# Patient Record
Sex: FEMALE | Race: OTHER | URBAN - METROPOLITAN AREA
[De-identification: names, ages, dates, MRNs, and addresses within clinical notes are randomized per-mention and may not be internally consistent; named-entity substitution may affect disease eponyms.]

---

## 2022-06-26 ENCOUNTER — EMERGENCY (EMERGENCY)
Facility: HOSPITAL | Age: 22
LOS: 0 days | Discharge: HOME | End: 2022-06-27
Attending: EMERGENCY MEDICINE
Payer: MEDICAID

## 2022-06-26 VITALS
RESPIRATION RATE: 18 BRPM | TEMPERATURE: 99 F | OXYGEN SATURATION: 97 % | SYSTOLIC BLOOD PRESSURE: 124 MMHG | DIASTOLIC BLOOD PRESSURE: 74 MMHG | HEART RATE: 89 BPM

## 2022-06-26 DIAGNOSIS — N94.6 DYSMENORRHEA, UNSPECIFIED: ICD-10-CM

## 2022-06-26 DIAGNOSIS — Z20.822 CONTACT WITH AND (SUSPECTED) EXPOSURE TO COVID-19: ICD-10-CM

## 2022-06-26 DIAGNOSIS — R10.2 PELVIC AND PERINEAL PAIN: ICD-10-CM

## 2022-06-26 DIAGNOSIS — R10.30 LOWER ABDOMINAL PAIN, UNSPECIFIED: ICD-10-CM

## 2022-06-26 DIAGNOSIS — R51.9 HEADACHE, UNSPECIFIED: ICD-10-CM

## 2022-06-26 LAB
BASOPHILS # BLD AUTO: 0.03 K/UL — SIGNIFICANT CHANGE UP (ref 0–0.2)
BASOPHILS NFR BLD AUTO: 0.3 % — SIGNIFICANT CHANGE UP (ref 0–2)
EOSINOPHIL # BLD AUTO: 0.32 K/UL — SIGNIFICANT CHANGE UP (ref 0–0.5)
EOSINOPHIL NFR BLD AUTO: 2.8 % — SIGNIFICANT CHANGE UP (ref 0–6)
HCT VFR BLD CALC: 39 % — SIGNIFICANT CHANGE UP (ref 34.5–45)
HGB BLD-MCNC: 13.5 G/DL — SIGNIFICANT CHANGE UP (ref 11.5–15.5)
IMM GRANULOCYTES NFR BLD AUTO: 0.3 % — SIGNIFICANT CHANGE UP (ref 0–1.5)
LYMPHOCYTES # BLD AUTO: 3.77 K/UL — HIGH (ref 1–3.3)
LYMPHOCYTES # BLD AUTO: 33.1 % — SIGNIFICANT CHANGE UP (ref 13–44)
MCHC RBC-ENTMCNC: 31 PG — SIGNIFICANT CHANGE UP (ref 27–34)
MCHC RBC-ENTMCNC: 34.6 GM/DL — SIGNIFICANT CHANGE UP (ref 32–36)
MCV RBC AUTO: 89.7 FL — SIGNIFICANT CHANGE UP (ref 80–100)
MONOCYTES # BLD AUTO: 0.69 K/UL — SIGNIFICANT CHANGE UP (ref 0–0.9)
MONOCYTES NFR BLD AUTO: 6.1 % — SIGNIFICANT CHANGE UP (ref 2–14)
NEUTROPHILS # BLD AUTO: 6.56 K/UL — SIGNIFICANT CHANGE UP (ref 1.8–7.4)
NEUTROPHILS NFR BLD AUTO: 57.4 % — SIGNIFICANT CHANGE UP (ref 43–77)
PLATELET # BLD AUTO: 272 K/UL — SIGNIFICANT CHANGE UP (ref 150–400)
RBC # BLD: 4.35 M/UL — SIGNIFICANT CHANGE UP (ref 3.8–5.2)
RBC # FLD: 12.2 % — SIGNIFICANT CHANGE UP (ref 10.3–14.5)
WBC # BLD: 11.4 K/UL — HIGH (ref 3.8–10.5)
WBC # FLD AUTO: 11.4 K/UL — HIGH (ref 3.8–10.5)

## 2022-06-26 PROCEDURE — 99285 EMERGENCY DEPT VISIT HI MDM: CPT

## 2022-06-26 PROCEDURE — 0241U: CPT

## 2022-06-26 PROCEDURE — 76830 TRANSVAGINAL US NON-OB: CPT

## 2022-06-26 PROCEDURE — 99284 EMERGENCY DEPT VISIT MOD MDM: CPT | Mod: 25

## 2022-06-26 PROCEDURE — 80053 COMPREHEN METABOLIC PANEL: CPT

## 2022-06-26 PROCEDURE — 36415 COLL VENOUS BLD VENIPUNCTURE: CPT

## 2022-06-26 PROCEDURE — 93975 VASCULAR STUDY: CPT

## 2022-06-26 PROCEDURE — 81001 URINALYSIS AUTO W/SCOPE: CPT

## 2022-06-26 PROCEDURE — 85025 COMPLETE CBC W/AUTO DIFF WBC: CPT

## 2022-06-26 RX ORDER — SODIUM CHLORIDE 9 MG/ML
1000 INJECTION INTRAMUSCULAR; INTRAVENOUS; SUBCUTANEOUS ONCE
Refills: 0 | Status: COMPLETED | OUTPATIENT
Start: 2022-06-26 | End: 2022-06-26

## 2022-06-26 RX ORDER — ACETAMINOPHEN 500 MG
650 TABLET ORAL ONCE
Refills: 0 | Status: COMPLETED | OUTPATIENT
Start: 2022-06-26 | End: 2022-06-26

## 2022-06-26 RX ADMIN — Medication 650 MILLIGRAM(S): at 23:31

## 2022-06-26 RX ADMIN — SODIUM CHLORIDE 2000 MILLILITER(S): 9 INJECTION INTRAMUSCULAR; INTRAVENOUS; SUBCUTANEOUS at 23:31

## 2022-06-26 NOTE — ED ADULT TRIAGE NOTE - CHIEF COMPLAINT QUOTE
Pt comes to the ED complaining of fever, headache, abdominal pain and nausea. Pt suspects that she may be pregnant. Pt states that she has had her period 3 times this month 6/1, 6/8 and 6/26. ZACH

## 2022-06-26 NOTE — ED PROVIDER NOTE - NSFOLLOWUPINSTRUCTIONS_ED_ALL_ED_FT
Dismenorrea  Dysmenorrhea       La dismenorrea consiste en tener calambres dolorosos sabrina la menstruación (período menstrual). Sentirá dolor en la iza baja del vientre (abdomen). La causa del dolor son los espasmos (contracciones) de los músculos del útero. El dolor puede ser leve o muy intenso. Con esta afección, puede presentar lo siguiente:    Dolor de isma.  Malestar estomacal (náuseas).  Vómitos.  Sentir dolor en la parte inferior de la espalda.    Siga estas indicaciones en loza casa:      Aliviar el dolor y los calambres     Aplique calor en la parte inferior de la espalda o el vientre cuando tiene dolor o calambres. Use la muriel de calor que el médico le indique.    Colóquese homer toalla entre la piel y la muriel de calor.  Aplique el calor sabrina 20 a 30 minutos.  Retire la muriel de calor si la piel se pone de color ryan brillante. [place] es muy importante si no puede sentir el dolor, el calor o el frío.  No duerma con la almohadilla térmica.  Ginger ejercicios aeróbicos. Estos pueden incluir caminar, nadar o andar en bicicleta. Pueden ayudar a aliviar los calambres.  Masajee la iza inferior de la espalda o el vientre. [place] puede ayudar a reducir el dolor.        Instrucciones generales    [place] los medicamentos de venta karen y los recetados solamente tyler se lo haya indicado el médico.  No conduzca ni use maquinaria pesada mientras storm analgésicos recetados.  Evite la ingesta de alcohol y cafeína inmediatamente antes y sabrina el período menstrual. [place] puede empeorar los calambres.  No consuma ningún producto que contenga nicotina o tabaco. [place] incluye cigarrillos y cigarrillos electrónicos. Si necesita ayuda para dejar de fumar, consulte al médico.  Concurra a todas las visitas de control tyler se lo haya indicado el médico. [place] es importante.    Comuníquese con un médico si:  Siente un dolor que empeora.  El dolor no mejora con medicamentos.  Siente dolor sabrian las relaciones sexuales.  Siente malestar estomacal o tiene vómitos sabrina el período menstrual, que no se van con medicamentos.    Solicite ayuda de inmediato si:  Pierde el conocimiento (se desmaya).    Resumen  La dismenorrea consiste en tener calambres dolorosos sabrina la menstruación (período menstrual).  Aplique calor en la parte inferior de la espalda o el vientre cuando tiene dolor o calambres.  Ginger ejercicios aeróbicos tyler caminar, nadar o andar en bicicleta.  Comuníquese con un médico si siente dolor sabrina la relación sexual.    NOTAS ADICIONALES E INSTRUCCIONES    Please follow up with your Primary MD in 24-48 hr.  Seek immediate medical care for any new/worsening signs or symptoms.

## 2022-06-26 NOTE — ED PROVIDER NOTE - CLINICAL SUMMARY MEDICAL DECISION MAKING FREE TEXT BOX
Differential diagnosis includes ovarian cyst/torsion, intrauterine pregnancy/ectopic pregnancy, dysmenorrhea.  Plan: Pain control with Tylenol, CBC, CMP, urinalysis with culture, urine pregnancy test and transvaginal ultrasound.

## 2022-06-26 NOTE — ED PROVIDER NOTE - OBJECTIVE STATEMENT
23 y/o F with no PMHx p/w intermittent dull lower abdominal and pelvic pain for the past 4 days gradually becoming worse, dull headache and 3 periods this month.  Pt is concerned she may be pregnant but didn't yet take a test.  Pt notes some hematuria.  She notes her current period started today and was heavy in the AM but light currently.

## 2022-06-26 NOTE — ED PROVIDER NOTE - INTERNATIONAL TRAVEL
ED Attending Physician Note      Patient : Luci Ayala Age: 54 year old Sex: female   MRN: [id number] Encounter Date: 3/7/2020      History   Time Seen: 7:43 PM      Chief Complaint   Patient presents with   • Back Pain     HPI    54-year-old female presents emerge department, right-sided low back pain rating down the right lower extremity.  The patient second ER visit this week for this problem, and she was also seen in the neurosurgery clinic.  She is been having symptoms for about 6 to 8 weeks, she was taking NSAIDs at home without relief.  The pain is worse with movement.  Is located in the right low back and right buttock and radiates down the right leg.  No trauma to the area, no fever, no history of IV drug abuse.  No recent weight loss.  No loss of bowel or bladder, no saddle anesthesia.   After ER evaluation earlier this week, her pain was mildly relieved with Toradol, [place].  She was able to ambulate.  She picked up a prescription for Valium, Medrol Dosepak, Lidoderm patch is not having any significant relief.  She was seen in the neurosurgery clinic and referred for physical therapy which she has been doing, in addition she was started on Robaxin but the pain is been getting progressively worse and she is no longer able to walk.  She denies any focal weakness but states the pain is so severe that she cannot sit up or move.    She does not smoke or drink alcohol  her only other medical history is depression for which she takes Effexor.      PMD: Lazara Hathaway MD    No Known Allergies    Current Discharge Medication List      Prior to Admission Medications    Details   venlafaxine XR (EFFEXOR XR) 37.5 MG 24 hr capsule Take 1 capsule by mouth daily.  Refills: 1      methocarbamol (ROBAXIN) 750 MG tablet Take 1 tablet by mouth 4 times daily as needed (muscle spasm).  Qty: 30 tablet, Refills: 0      methylPREDNISolone (MEDROL DOSEPAK) 4 MG tablet Take 1 tablet by mouth as directed. follow package  directions  Qty: 21 tablet, Refills: 0      diazePAM (VALIUM) 5 MG tablet Take 1 tablet by mouth nightly as needed for Muscle spasms.  Qty: 5 tablet, Refills: 0             Current Discharge Medication List          No past medical history on file.    Past Surgical History:   Procedure Laterality Date   • CHOLECYSTECTOMY     • HYSTERECTOMY     • SHOULDER SURGERY Bilateral    • TONSILLECTOMY         No family history on file.    Social History     Tobacco Use   • Smoking status: Never Smoker   • Smokeless tobacco: Never Used   Substance Use Topics   • Alcohol use: Not Currently     Comment: rare use   • Drug use: Never       Review of Systems    REVIEW OF SYSTEMS    Constitutional:  As above  Skin:  No rash, no pruritus.    ENMT:  No sore throat, no nasal congestion, No ear pain,    Respiratory:  No cough, No shortness of breath,    Cardiovascular: no chest pressure, No palpitations,    Gastrointestinal: No abdominal pain, no nausea, no vomiting, no diarrhea.    Genitourinary: No dysuria, no hematuria.    Neurologic:  As above    Hematologic/Lymphatic: no abnormal bruIsing or bleeding  Musculoskeletal: as above  Additional review of systems information: All other systems reviewed and otherwise negative.     Physical Exam     ED Triage Vitals [03/07/20 1951]   ED Triage Vitals Group      Temp 98.5 °F (36.9 °C)      Heart Rate 88      Resp 16      BP (!) 152/87      SpO2 96 %      EtCO2 mmHg       Height 5' 4\" (1.626 m)      Weight 173 lb 4.5 oz (78.6 kg)      Weight Scale Used ED Actual      BMI (Calculated) 29.74      IBW/kg (Calculated) 54.7       Physical Exam  General: Patient is alert. Uncomfortable    Skin: The skin is warm and dry withou rash or lesions.    Eyes: The eyes are anicteric, PERRLA and with EOMI.    HEENT: normal MM, no jaw malocclusion   CV: The heart rate is regular in rate and rhythm with no murmurs.  There is no peripheral edema.  Cap refill is <3 seconds in all extremities.    Pulm:  Respirations are regular and unlabored with clear lungs bilaterally.     GI: The abdomen is soft, with no distension or guarding.   : deferred  Extremities: Normal ROM, no deformity, strength equal throughout   +R. Straight leg test. Neg contralateral leg test.  No midline tenderness/deformtiy   Neuro: intact sensation to light touch throughout. Normal tone.  Normal patellar and achilles reflexes     ED Course     MDM  54-year-old female presents emerge department with right low back pain with radicular symptoms consistent with sciatica.  Patient is extremely uncomfortable on exam, consistent with her presentation under this week as well.  She has tried NSAID therapy for about 6 weeks prior to initially coming to the emergency room.  Since that time she has tried opiates, Valium, Robaxin followed up with neurosurgery and done physical therapy and is still having persistent pain and in fact worsening of her symptoms.  As such she will place an IV and give morphine, Zofran, Toradol, Decadron and plan for observation overnight for pain control and further neurosurgery evaluation and treatment.    8:16 PM  D/w dr. shepherd on call for amg nsgy who agrees w/ this plan.  Will not obtain MRI overnight as is not emergent, no concern for acute cord compression, can be done in AM.      8:45 PM  D/w dr. boswell on call [facility] hosp accepts pt into obs    ED course:           Lab Results     Results for orders placed or performed during the hospital encounter of 03/07/20   CBC with Automated Differential   Result Value Ref Range    WBC 10.5 4.2 - 11.0 K/mcL    RBC 4.63 4.00 - 5.20 mil/mcL    HGB 14.3 12.0 - 15.5 g/dL    HCT 44.3 36.0 - 46.5 %    MCV 95.7 78.0 - 100.0 fl    MCH 30.9 26.0 - 34.0 pg    MCHC 32.3 32.0 - 36.5 g/dL    RDW-CV 12.8 11.0 - 15.0 %     140 - 450 K/mcL    NRBC 0 0 /100 WBC    DIFF TYPE AUTOMATED DIFFERENTIAL     Neutrophil 77 %    LYMPH 13 %    MONO 8 %    EOSIN 0 %    BASO 1 %    Percent Immature  Granuloctyes 1 %    Absolute Neutrophil 8.1 (H) 1.8 - 7.7 K/mcL    Absolute Lymph 1.4 1.0 - 4.0 K/mcL    Absolute Mono 0.9 0.3 - 0.9 K/mcL    Absolute Eos 0.0 (L) 0.1 - 0.5 K/mcL    Absolute Baso 0.1 0.0 - 0.3 K/mcL    Absolute Immature Granulocytes 0.1 0 - 0.2 K/mcl   Basic Metabolic Panel   Result Value Ref Range    Sodium 137 135 - 145 mmol/L    Potassium 3.6 3.4 - 5.1 mmol/L    Chloride 104 98 - 107 mmol/L    Carbon Dioxide 29 21 - 32 mmol/L    Anion Gap 8 (L) 10 - 20 mmol/L    Glucose 142 (H) 65 - 99 mg/dL    BUN 20 6 - 20 mg/dL    Creatinine 0.76 0.51 - 0.95 mg/dL    GFR Estimate,  >90     GFR Estimate, Non African American 90     BUN/Creatinine Ratio 26 (H) 7 - 25    CALCIUM 9.3 8.4 - 10.2 mg/dL         Radiology Results     Imaging Results    None         ED Medication Orders (From admission, onward)    Ordered Start     Status Ordering Provider    03/07/20 1948 03/07/20 2000  morphine injection 4 mg  ONCE      Last MAR action:  Given JACQUELINE CATHERINE    03/07/20 1948 03/07/20 2000  ondansetron (ZOFRAN) injection 4 mg  ONCE      Last MAR action:  Given JACQUELINE CATHERINE    03/07/20 1948 03/07/20 2000  ketorolac injection 15 mg  ONCE      Last MAR action:  Given JACQUELINE CATHERINE    03/07/20 1948 03/07/20 2000  dexamethasone (PF) (DECADRON) injection 10 mg  ONCE      Last MAR action:  Given JACQUELINE CATHERINE    03/07/20 1944 03/07/20 1945    ONCE      Discontinued JACQUELINE CATHERINE    03/07/20 1944 03/07/20 1945    ONCE      Discontinued JACQUELINE CATHERINE          Procedures    Clinical Impression     Diagnosis:   Sciatica  Acute low back pain     Disposition        Admit 3/7/2020  8:45 PM  Telemetry Bed?: No  Patient Class: Observation [4]  Level of Care: Acute [1]  Admission Diagnosis: Sciatica [724.3.ICD-9-CM]  Admitting Physician: TIMA BUSTAMANTE [[id number]]  Requested Unit/Floor: med surg  Is this a telephone or verbal order?: This is a telephone order from the admitting  physician    Devante Cross MD  [facility]  3/7/2020 8:45 PM      I have reviewed all diagnostic test results and discussed them and their implications with the patient.  The patient and their family had the opportunity to ask questions all of which were answered to their satisfaction.              Andrew Cross MD  03/07/20 2045     No

## 2022-06-26 NOTE — ED PROVIDER NOTE - CARE PROVIDER_API CALL
Debi Delcid)  Obstetrics and Gynecology  [address] [place] [address]  Phone: [phone]  Fax: [phone]  Follow Up Time: 1-3 Days

## 2022-06-26 NOTE — ED PROVIDER NOTE - PATIENT PORTAL LINK FT
You can access the FollowMyHealth Patient Portal offered by [facility] by registering at the following website: [email or web address]. By joining [employer]’s FollowMyHealth portal, you will also be able to view your health information using other applications (apps) compatible with our system.

## 2022-06-27 VITALS
HEART RATE: 80 BPM | TEMPERATURE: 98 F | SYSTOLIC BLOOD PRESSURE: 119 MMHG | OXYGEN SATURATION: 98 % | DIASTOLIC BLOOD PRESSURE: 71 MMHG | RESPIRATION RATE: 18 BRPM

## 2022-06-27 LAB
ALBUMIN SERPL ELPH-MCNC: 3.3 G/DL — SIGNIFICANT CHANGE UP (ref 3.3–5)
ALP SERPL-CCNC: 110 U/L — SIGNIFICANT CHANGE UP (ref 40–120)
ALT FLD-CCNC: 20 U/L — SIGNIFICANT CHANGE UP (ref 12–78)
ANION GAP SERPL CALC-SCNC: 1 MMOL/L — LOW (ref 5–17)
APPEARANCE UR: CLEAR — SIGNIFICANT CHANGE UP
AST SERPL-CCNC: 4 U/L — LOW (ref 15–37)
BILIRUB SERPL-MCNC: 0.3 MG/DL — SIGNIFICANT CHANGE UP (ref 0.2–1.2)
BILIRUB UR-MCNC: NEGATIVE — SIGNIFICANT CHANGE UP
BUN SERPL-MCNC: 9 MG/DL — SIGNIFICANT CHANGE UP (ref 7–23)
CALCIUM SERPL-MCNC: 9.6 MG/DL — SIGNIFICANT CHANGE UP (ref 8.5–10.1)
CHLORIDE SERPL-SCNC: 108 MMOL/L — SIGNIFICANT CHANGE UP (ref 96–108)
CO2 SERPL-SCNC: 27 MMOL/L — SIGNIFICANT CHANGE UP (ref 22–31)
COLOR SPEC: YELLOW — SIGNIFICANT CHANGE UP
CREAT SERPL-MCNC: 0.63 MG/DL — SIGNIFICANT CHANGE UP (ref 0.5–1.3)
DIFF PNL FLD: ABNORMAL
EGFR: 129 ML/MIN/1.73M2 — SIGNIFICANT CHANGE UP
FLUAV AG NPH QL: SIGNIFICANT CHANGE UP
FLUBV AG NPH QL: SIGNIFICANT CHANGE UP
GLUCOSE SERPL-MCNC: 98 MG/DL — SIGNIFICANT CHANGE UP (ref 70–99)
GLUCOSE UR QL: NEGATIVE — SIGNIFICANT CHANGE UP
KETONES UR-MCNC: NEGATIVE — SIGNIFICANT CHANGE UP
LEUKOCYTE ESTERASE UR-ACNC: NEGATIVE — SIGNIFICANT CHANGE UP
NITRITE UR-MCNC: NEGATIVE — SIGNIFICANT CHANGE UP
PH UR: 8 — SIGNIFICANT CHANGE UP (ref 5–8)
POTASSIUM SERPL-MCNC: 4 MMOL/L — SIGNIFICANT CHANGE UP (ref 3.5–5.3)
POTASSIUM SERPL-SCNC: 4 MMOL/L — SIGNIFICANT CHANGE UP (ref 3.5–5.3)
PROT SERPL-MCNC: 7.9 GM/DL — SIGNIFICANT CHANGE UP (ref 6–8.3)
PROT UR-MCNC: NEGATIVE — SIGNIFICANT CHANGE UP
RSV RNA NPH QL NAA+NON-PROBE: SIGNIFICANT CHANGE UP
SARS-COV-2 RNA SPEC QL NAA+PROBE: SIGNIFICANT CHANGE UP
SODIUM SERPL-SCNC: 136 MMOL/L — SIGNIFICANT CHANGE UP (ref 135–145)
SP GR SPEC: 1.01 — SIGNIFICANT CHANGE UP (ref 1.01–1.02)
UROBILINOGEN FLD QL: NEGATIVE — SIGNIFICANT CHANGE UP

## 2022-06-27 PROCEDURE — 76830 TRANSVAGINAL US NON-OB: CPT | Mod: 26

## 2022-06-27 PROCEDURE — 93975 VASCULAR STUDY: CPT | Mod: 26

## 2022-06-27 NOTE — ED ADULT NURSE NOTE - NSIMPLEMENTINTERV_GEN_ALL_ED
Implemented All Universal Safety Interventions:  [place] to call system. Call bell, personal items and telephone within reach. Instruct patient to call for assistance. Room bathroom lighting operational. Non-slip footwear when patient is off stretcher. Physically safe environment: no spills, clutter or unnecessary equipment. Stretcher in lowest position, wheels locked, appropriate side rails in place.

## 2022-09-27 ENCOUNTER — APPOINTMENT (OUTPATIENT)
Dept: ANTEPARTUM | Facility: CLINIC | Age: 22
End: 2022-09-27

## 2022-09-27 ENCOUNTER — ASOB RESULT (OUTPATIENT)
Age: 22
End: 2022-09-27

## 2022-09-27 PROBLEM — Z78.9 OTHER SPECIFIED HEALTH STATUS: Chronic | Status: ACTIVE | Noted: 2022-06-26

## 2022-09-27 PROBLEM — Z00.00 ENCOUNTER FOR PREVENTIVE HEALTH EXAMINATION: Status: ACTIVE | Noted: 2022-09-27

## 2022-09-27 PROCEDURE — 76813 OB US NUCHAL MEAS 1 GEST: CPT

## 2022-11-15 ENCOUNTER — ASOB RESULT (OUTPATIENT)
Age: 22
End: 2022-11-15

## 2022-11-15 ENCOUNTER — APPOINTMENT (OUTPATIENT)
Dept: ANTEPARTUM | Facility: CLINIC | Age: 22
End: 2022-11-15

## 2022-11-15 PROCEDURE — 76805 OB US >/= 14 WKS SNGL FETUS: CPT

## 2023-03-08 ENCOUNTER — APPOINTMENT (OUTPATIENT)
Dept: ANTEPARTUM | Facility: CLINIC | Age: 23
End: 2023-03-08
Payer: MEDICAID

## 2023-03-08 ENCOUNTER — ASOB RESULT (OUTPATIENT)
Age: 23
End: 2023-03-08

## 2023-03-08 PROCEDURE — 76816 OB US FOLLOW-UP PER FETUS: CPT

## 2023-04-04 ENCOUNTER — INPATIENT (INPATIENT)
Facility: HOSPITAL | Age: 23
LOS: 2 days | Discharge: HOME | DRG: 560 | End: 2023-04-07
Attending: OBSTETRICS & GYNECOLOGY | Admitting: OBSTETRICS & GYNECOLOGY
Payer: MEDICAID

## 2023-04-04 DIAGNOSIS — O26.899 OTHER SPECIFIED PREGNANCY RELATED CONDITIONS, UNSPECIFIED TRIMESTER: ICD-10-CM

## 2023-04-04 LAB
ALBUMIN SERPL ELPH-MCNC: 2.6 G/DL — LOW (ref 3.3–5)
ALP SERPL-CCNC: 146 U/L — HIGH (ref 40–120)
ALT FLD-CCNC: 14 U/L — SIGNIFICANT CHANGE UP (ref 12–78)
ANION GAP SERPL CALC-SCNC: 8 MMOL/L — SIGNIFICANT CHANGE UP (ref 5–17)
APPEARANCE UR: CLEAR — SIGNIFICANT CHANGE UP
APTT BLD: 25.3 SEC — LOW (ref 27.5–35.5)
AST SERPL-CCNC: 13 U/L — LOW (ref 15–37)
BASOPHILS # BLD AUTO: 0.03 K/UL — SIGNIFICANT CHANGE UP (ref 0–0.2)
BASOPHILS NFR BLD AUTO: 0.2 % — SIGNIFICANT CHANGE UP (ref 0–2)
BILIRUB SERPL-MCNC: 0.2 MG/DL — SIGNIFICANT CHANGE UP (ref 0.2–1.2)
BILIRUB UR-MCNC: NEGATIVE — SIGNIFICANT CHANGE UP
BUN SERPL-MCNC: 9 MG/DL — SIGNIFICANT CHANGE UP (ref 7–23)
CALCIUM SERPL-MCNC: 9.6 MG/DL — SIGNIFICANT CHANGE UP (ref 8.5–10.1)
CHLORIDE SERPL-SCNC: 109 MMOL/L — HIGH (ref 96–108)
CO2 SERPL-SCNC: 21 MMOL/L — LOW (ref 22–31)
COLOR SPEC: YELLOW — SIGNIFICANT CHANGE UP
CREAT ?TM UR-MCNC: 43 MG/DL — SIGNIFICANT CHANGE UP
CREAT SERPL-MCNC: 0.5 MG/DL — SIGNIFICANT CHANGE UP (ref 0.5–1.3)
DIFF PNL FLD: NEGATIVE — SIGNIFICANT CHANGE UP
EGFR: 136 ML/MIN/1.73M2 — SIGNIFICANT CHANGE UP
EOSINOPHIL # BLD AUTO: 0.08 K/UL — SIGNIFICANT CHANGE UP (ref 0–0.5)
EOSINOPHIL NFR BLD AUTO: 0.7 % — SIGNIFICANT CHANGE UP (ref 0–6)
FIBRINOGEN PPP-MCNC: 599 MG/DL — HIGH (ref 330–520)
GLUCOSE SERPL-MCNC: 85 MG/DL — SIGNIFICANT CHANGE UP (ref 70–99)
GLUCOSE UR QL: NEGATIVE — SIGNIFICANT CHANGE UP
HCT VFR BLD CALC: 35.1 % — SIGNIFICANT CHANGE UP (ref 34.5–45)
HGB BLD-MCNC: 11.8 G/DL — SIGNIFICANT CHANGE UP (ref 11.5–15.5)
IMM GRANULOCYTES NFR BLD AUTO: 0.3 % — SIGNIFICANT CHANGE UP (ref 0–0.9)
INR BLD: 0.97 RATIO — SIGNIFICANT CHANGE UP (ref 0.88–1.16)
KETONES UR-MCNC: NEGATIVE — SIGNIFICANT CHANGE UP
LDH SERPL L TO P-CCNC: 165 U/L — SIGNIFICANT CHANGE UP (ref 84–241)
LEUKOCYTE ESTERASE UR-ACNC: ABNORMAL
LYMPHOCYTES # BLD AUTO: 25.7 % — SIGNIFICANT CHANGE UP (ref 13–44)
LYMPHOCYTES # BLD AUTO: 3.13 K/UL — SIGNIFICANT CHANGE UP (ref 1–3.3)
MCHC RBC-ENTMCNC: 28.9 PG — SIGNIFICANT CHANGE UP (ref 27–34)
MCHC RBC-ENTMCNC: 33.6 GM/DL — SIGNIFICANT CHANGE UP (ref 32–36)
MCV RBC AUTO: 86 FL — SIGNIFICANT CHANGE UP (ref 80–100)
MONOCYTES # BLD AUTO: 0.75 K/UL — SIGNIFICANT CHANGE UP (ref 0–0.9)
MONOCYTES NFR BLD AUTO: 6.1 % — SIGNIFICANT CHANGE UP (ref 2–14)
NEUTROPHILS # BLD AUTO: 8.17 K/UL — HIGH (ref 1.8–7.4)
NEUTROPHILS NFR BLD AUTO: 67 % — SIGNIFICANT CHANGE UP (ref 43–77)
NITRITE UR-MCNC: NEGATIVE — SIGNIFICANT CHANGE UP
PH UR: 6 — SIGNIFICANT CHANGE UP (ref 5–8)
PLATELET # BLD AUTO: 182 K/UL — SIGNIFICANT CHANGE UP (ref 150–400)
POTASSIUM SERPL-MCNC: 3.8 MMOL/L — SIGNIFICANT CHANGE UP (ref 3.5–5.3)
POTASSIUM SERPL-SCNC: 3.8 MMOL/L — SIGNIFICANT CHANGE UP (ref 3.5–5.3)
PROT ?TM UR-MCNC: 6 MG/DL — SIGNIFICANT CHANGE UP (ref 0–12)
PROT SERPL-MCNC: 6.8 GM/DL — SIGNIFICANT CHANGE UP (ref 6–8.3)
PROT UR-MCNC: NEGATIVE — SIGNIFICANT CHANGE UP
PROT/CREAT UR-RTO: 0.1 RATIO — SIGNIFICANT CHANGE UP (ref 0–0.2)
PROTHROM AB SERPL-ACNC: 11.3 SEC — SIGNIFICANT CHANGE UP (ref 10.5–13.4)
RBC # BLD: 4.08 M/UL — SIGNIFICANT CHANGE UP (ref 3.8–5.2)
RBC # FLD: 13.5 % — SIGNIFICANT CHANGE UP (ref 10.3–14.5)
SODIUM SERPL-SCNC: 138 MMOL/L — SIGNIFICANT CHANGE UP (ref 135–145)
SP GR SPEC: 1.01 — SIGNIFICANT CHANGE UP (ref 1.01–1.02)
URATE SERPL-MCNC: 4.6 MG/DL — SIGNIFICANT CHANGE UP (ref 2.5–7)
UROBILINOGEN FLD QL: NEGATIVE — SIGNIFICANT CHANGE UP
WBC # BLD: 12.2 K/UL — HIGH (ref 3.8–10.5)
WBC # FLD AUTO: 12.2 K/UL — HIGH (ref 3.8–10.5)

## 2023-04-04 PROCEDURE — 84550 ASSAY OF BLOOD/URIC ACID: CPT

## 2023-04-04 PROCEDURE — 83615 LACTATE (LD) (LDH) ENZYME: CPT

## 2023-04-04 PROCEDURE — 86780 TREPONEMA PALLIDUM: CPT

## 2023-04-04 PROCEDURE — 82570 ASSAY OF URINE CREATININE: CPT

## 2023-04-04 PROCEDURE — 85730 THROMBOPLASTIN TIME PARTIAL: CPT

## 2023-04-04 PROCEDURE — 87635 SARS-COV-2 COVID-19 AMP PRB: CPT

## 2023-04-04 PROCEDURE — 86900 BLOOD TYPING SEROLOGIC ABO: CPT

## 2023-04-04 PROCEDURE — 36415 COLL VENOUS BLD VENIPUNCTURE: CPT

## 2023-04-04 PROCEDURE — 85025 COMPLETE CBC W/AUTO DIFF WBC: CPT

## 2023-04-04 PROCEDURE — 86769 SARS-COV-2 COVID-19 ANTIBODY: CPT

## 2023-04-04 PROCEDURE — 99214 OFFICE O/P EST MOD 30 MIN: CPT

## 2023-04-04 PROCEDURE — 85018 HEMOGLOBIN: CPT

## 2023-04-04 PROCEDURE — 85610 PROTHROMBIN TIME: CPT

## 2023-04-04 PROCEDURE — 85014 HEMATOCRIT: CPT

## 2023-04-04 PROCEDURE — 86850 RBC ANTIBODY SCREEN: CPT

## 2023-04-04 PROCEDURE — 81001 URINALYSIS AUTO W/SCOPE: CPT

## 2023-04-04 PROCEDURE — 85384 FIBRINOGEN ACTIVITY: CPT

## 2023-04-04 PROCEDURE — 80053 COMPREHEN METABOLIC PANEL: CPT

## 2023-04-04 PROCEDURE — 86901 BLOOD TYPING SEROLOGIC RH(D): CPT

## 2023-04-04 PROCEDURE — 84156 ASSAY OF PROTEIN URINE: CPT

## 2023-04-04 PROCEDURE — 59050 FETAL MONITOR W/REPORT: CPT

## 2023-04-04 RX ORDER — SODIUM CHLORIDE 9 MG/ML
1000 INJECTION, SOLUTION INTRAVENOUS
Refills: 0 | Status: DISCONTINUED | OUTPATIENT
Start: 2023-04-04 | End: 2023-04-05

## 2023-04-04 RX ORDER — CHLORHEXIDINE GLUCONATE 213 G/1000ML
1 SOLUTION TOPICAL ONCE
Refills: 0 | Status: DISCONTINUED | OUTPATIENT
Start: 2023-04-04 | End: 2023-04-05

## 2023-04-04 RX ORDER — OXYTOCIN 10 UNIT/ML
333.33 VIAL (ML) INJECTION
Qty: 20 | Refills: 0 | Status: COMPLETED | OUTPATIENT
Start: 2023-04-04 | End: 2023-04-05

## 2023-04-04 RX ORDER — CITRIC ACID/SODIUM CITRATE 300-500 MG
30 SOLUTION, ORAL ORAL ONCE
Refills: 0 | Status: DISCONTINUED | OUTPATIENT
Start: 2023-04-04 | End: 2023-04-05

## 2023-04-05 VITALS — HEIGHT: 66 IN | WEIGHT: 199.96 LBS

## 2023-04-05 LAB
ABO RH CONFIRMATION: SIGNIFICANT CHANGE UP
COVID-19 SPIKE DOMAIN AB INTERP: POSITIVE
COVID-19 SPIKE DOMAIN ANTIBODY RESULT: >250 U/ML — HIGH
PROT ?TM UR-MCNC: 7 MG/DL — SIGNIFICANT CHANGE UP (ref 0–12)
SARS-COV-2 IGG+IGM SERPL QL IA: >250 U/ML — HIGH
SARS-COV-2 IGG+IGM SERPL QL IA: POSITIVE
SARS-COV-2 RNA SPEC QL NAA+PROBE: SIGNIFICANT CHANGE UP
T PALLIDUM AB TITR SER: NEGATIVE — SIGNIFICANT CHANGE UP

## 2023-04-05 RX ORDER — LANOLIN
1 OINTMENT (GRAM) TOPICAL EVERY 6 HOURS
Refills: 0 | Status: DISCONTINUED | OUTPATIENT
Start: 2023-04-05 | End: 2023-04-07

## 2023-04-05 RX ORDER — BENZOCAINE 10 %
1 GEL (GRAM) MUCOUS MEMBRANE EVERY 6 HOURS
Refills: 0 | Status: DISCONTINUED | OUTPATIENT
Start: 2023-04-05 | End: 2023-04-07

## 2023-04-05 RX ORDER — OXYCODONE HYDROCHLORIDE 5 MG/1
5 TABLET ORAL ONCE
Refills: 0 | Status: DISCONTINUED | OUTPATIENT
Start: 2023-04-05 | End: 2023-04-07

## 2023-04-05 RX ORDER — IBUPROFEN 200 MG
600 TABLET ORAL EVERY 6 HOURS
Refills: 0 | Status: COMPLETED | OUTPATIENT
Start: 2023-04-05 | End: 2024-03-03

## 2023-04-05 RX ORDER — SIMETHICONE 80 MG/1
80 TABLET, CHEWABLE ORAL EVERY 4 HOURS
Refills: 0 | Status: DISCONTINUED | OUTPATIENT
Start: 2023-04-05 | End: 2023-04-07

## 2023-04-05 RX ORDER — OXYTOCIN 10 UNIT/ML
41.67 VIAL (ML) INJECTION
Qty: 20 | Refills: 0 | Status: DISCONTINUED | OUTPATIENT
Start: 2023-04-05 | End: 2023-04-07

## 2023-04-05 RX ORDER — AER TRAVELER 0.5 G/1
1 SOLUTION RECTAL; TOPICAL EVERY 4 HOURS
Refills: 0 | Status: DISCONTINUED | OUTPATIENT
Start: 2023-04-05 | End: 2023-04-07

## 2023-04-05 RX ORDER — MAGNESIUM HYDROXIDE 400 MG/1
30 TABLET, CHEWABLE ORAL
Refills: 0 | Status: DISCONTINUED | OUTPATIENT
Start: 2023-04-05 | End: 2023-04-07

## 2023-04-05 RX ORDER — PRAMOXINE HYDROCHLORIDE 150 MG/15G
1 AEROSOL, FOAM RECTAL EVERY 4 HOURS
Refills: 0 | Status: DISCONTINUED | OUTPATIENT
Start: 2023-04-05 | End: 2023-04-07

## 2023-04-05 RX ORDER — ACETAMINOPHEN 500 MG
975 TABLET ORAL
Refills: 0 | Status: DISCONTINUED | OUTPATIENT
Start: 2023-04-05 | End: 2023-04-07

## 2023-04-05 RX ORDER — SODIUM CHLORIDE 9 MG/ML
3 INJECTION INTRAMUSCULAR; INTRAVENOUS; SUBCUTANEOUS EVERY 8 HOURS
Refills: 0 | Status: DISCONTINUED | OUTPATIENT
Start: 2023-04-05 | End: 2023-04-06

## 2023-04-05 RX ORDER — HYDROCORTISONE 1 %
1 OINTMENT (GRAM) TOPICAL EVERY 6 HOURS
Refills: 0 | Status: DISCONTINUED | OUTPATIENT
Start: 2023-04-05 | End: 2023-04-07

## 2023-04-05 RX ORDER — DIBUCAINE 1 %
1 OINTMENT (GRAM) RECTAL EVERY 6 HOURS
Refills: 0 | Status: DISCONTINUED | OUTPATIENT
Start: 2023-04-05 | End: 2023-04-07

## 2023-04-05 RX ORDER — KETOROLAC TROMETHAMINE 30 MG/ML
30 SYRINGE (ML) INJECTION ONCE
Refills: 0 | Status: DISCONTINUED | OUTPATIENT
Start: 2023-04-05 | End: 2023-04-05

## 2023-04-05 RX ORDER — IBUPROFEN 200 MG
600 TABLET ORAL EVERY 6 HOURS
Refills: 0 | Status: DISCONTINUED | OUTPATIENT
Start: 2023-04-05 | End: 2023-04-07

## 2023-04-05 RX ORDER — OXYTOCIN 10 UNIT/ML
2 VIAL (ML) INJECTION
Qty: 30 | Refills: 0 | Status: DISCONTINUED | OUTPATIENT
Start: 2023-04-05 | End: 2023-04-07

## 2023-04-05 RX ORDER — TETANUS TOXOID, REDUCED DIPHTHERIA TOXOID AND ACELLULAR PERTUSSIS VACCINE, ADSORBED 5; 2.5; 8; 8; 2.5 [IU]/.5ML; [IU]/.5ML; UG/.5ML; UG/.5ML; UG/.5ML
0.5 SUSPENSION INTRAMUSCULAR ONCE
Refills: 0 | Status: DISCONTINUED | OUTPATIENT
Start: 2023-04-05 | End: 2023-04-07

## 2023-04-05 RX ORDER — DIPHENHYDRAMINE HCL 50 MG
25 CAPSULE ORAL EVERY 6 HOURS
Refills: 0 | Status: DISCONTINUED | OUTPATIENT
Start: 2023-04-05 | End: 2023-04-07

## 2023-04-05 RX ORDER — OXYCODONE HYDROCHLORIDE 5 MG/1
5 TABLET ORAL
Refills: 0 | Status: DISCONTINUED | OUTPATIENT
Start: 2023-04-05 | End: 2023-04-07

## 2023-04-05 RX ADMIN — Medication 30 MILLIGRAM(S): at 12:21

## 2023-04-05 RX ADMIN — Medication 600 MILLIGRAM(S): at 23:46

## 2023-04-05 RX ADMIN — Medication 975 MILLIGRAM(S): at 21:34

## 2023-04-05 RX ADMIN — Medication 30 MILLIGRAM(S): at 12:16

## 2023-04-05 RX ADMIN — Medication 2 MILLIUNIT(S)/MIN: at 06:20

## 2023-04-05 RX ADMIN — SODIUM CHLORIDE 3 MILLILITER(S): 9 INJECTION INTRAMUSCULAR; INTRAVENOUS; SUBCUTANEOUS at 22:00

## 2023-04-05 RX ADMIN — Medication 1000 MILLIUNIT(S)/MIN: at 12:15

## 2023-04-05 RX ADMIN — Medication 975 MILLIGRAM(S): at 15:29

## 2023-04-05 RX ADMIN — Medication 975 MILLIGRAM(S): at 16:00

## 2023-04-05 RX ADMIN — SODIUM CHLORIDE 125 MILLILITER(S): 9 INJECTION, SOLUTION INTRAVENOUS at 06:19

## 2023-04-05 RX ADMIN — Medication 600 MILLIGRAM(S): at 18:06

## 2023-04-05 RX ADMIN — Medication 975 MILLIGRAM(S): at 22:00

## 2023-04-05 NOTE — PATIENT PROFILE OB - FALL HARM RISK - UNIVERSAL INTERVENTIONS
Bed in lowest position, wheels locked, appropriate side rails in place/Call bell, personal items and telephone in reach/Instruct patient to call for assistance before getting out of bed or chair/Non-slip footwear when patient is out of bed/[place] to call system/Physically safe environment - no spills, clutter or unnecessary equipment/Purposeful Proactive Rounding/Room/bathroom lighting operational, light cord in reach

## 2023-04-06 ENCOUNTER — TRANSCRIPTION ENCOUNTER (OUTPATIENT)
Age: 23
End: 2023-04-06

## 2023-04-06 LAB
HCT VFR BLD CALC: 33.1 % — LOW (ref 34.5–45)
HGB BLD-MCNC: 10.9 G/DL — LOW (ref 11.5–15.5)

## 2023-04-06 RX ORDER — ACETAMINOPHEN 500 MG
3 TABLET ORAL
Qty: 0 | Refills: 0 | DISCHARGE
Start: 2023-04-06

## 2023-04-06 RX ORDER — IBUPROFEN 200 MG
1 TABLET ORAL
Qty: 0 | Refills: 0 | DISCHARGE
Start: 2023-04-06

## 2023-04-06 RX ADMIN — Medication 600 MILLIGRAM(S): at 06:30

## 2023-04-06 RX ADMIN — Medication 600 MILLIGRAM(S): at 00:39

## 2023-04-06 RX ADMIN — Medication 600 MILLIGRAM(S): at 19:15

## 2023-04-06 RX ADMIN — Medication 600 MILLIGRAM(S): at 18:25

## 2023-04-06 RX ADMIN — OXYCODONE HYDROCHLORIDE 5 MILLIGRAM(S): 5 TABLET ORAL at 01:13

## 2023-04-06 RX ADMIN — Medication 975 MILLIGRAM(S): at 03:19

## 2023-04-06 RX ADMIN — Medication 975 MILLIGRAM(S): at 22:00

## 2023-04-06 RX ADMIN — SODIUM CHLORIDE 3 MILLILITER(S): 9 INJECTION INTRAMUSCULAR; INTRAVENOUS; SUBCUTANEOUS at 06:30

## 2023-04-06 RX ADMIN — Medication 600 MILLIGRAM(S): at 05:43

## 2023-04-06 RX ADMIN — Medication 975 MILLIGRAM(S): at 21:22

## 2023-04-06 RX ADMIN — Medication 975 MILLIGRAM(S): at 04:00

## 2023-04-06 RX ADMIN — Medication 1 TABLET(S): at 09:29

## 2023-04-06 NOTE — DISCHARGE NOTE OB - NS MD DC FALL RISK RISK
For information on Fall & Injury Prevention, visit: [email or web address] OR  [email or web address] OR  https://www.cdc.gov/steadi/patient.html

## 2023-04-06 NOTE — DISCHARGE NOTE OB - PATIENT PORTAL LINK FT
You can access the FollowMyHealth Patient Portal offered by [facility] by registering at the following website: [email or web address]. By joining [employer]’s FollowMyHealth portal, you will also be able to view your health information using other applications (apps) compatible with our system.

## 2023-04-06 NOTE — DISCHARGE NOTE OB - HOSPITAL COURSE
22 y/o F  who is here for  at 40w 2/2 [facility]     Upon discharge she is voiding, tolerating PO, ambulating, lochia is decreasing, labs and vitals were stable, and pain is well controlled.

## 2023-04-06 NOTE — DISCHARGE NOTE OB - MEDICATION SUMMARY - MEDICATIONS TO TAKE
I will START or STAY ON the medications listed below when I get home from the hospital:    acetaminophen 325 mg oral tablet  -- 3 tab(s) by mouth every 6 hours  -- Indication: For pain    ibuprofen 600 mg oral tablet  -- 1 tab(s) by mouth every 6 hours  -- Indication: For pain    Prenatal Multivitamins with Folic Acid 1 mg oral tablet  -- 1 tab(s) by mouth once a day  -- Indication: For postpartum

## 2023-04-06 NOTE — PROGRESS NOTE ADULT - SUBJECTIVE AND OBJECTIVE BOX
SAGAR FOWLER is a 22yo  now PPD# 1 s/p spontaneous vaginal delivery at 40weeks, gHTN    S:    The patient has no complaints.  Pain controlled with current medications.   She is ambulating without difficulty and tolerating PO   + flatus/-BM/+ voiding   She endorses approciate lochia, which is decreasing  Breastfeeding    She denies fevers, chills, nausea and vomiting.   She denies lightheadedness, dizziness, palpitations, chest pain and SOB.    O:    T(C): 36.5 (23 @ 07:25), Max: 37.1 (23 @ 17:00)  HR: 89 (23 @ 07:25) (73 - 99)  BP: 112/55 (23 @ 07:25) (112/55 - 135/74)  RR: 16 (23 @ 07:25) (16 - 18)  SpO2: 98% (23 @ 07:25) (98% - 99%)    Gen: NAD, AOx3  CV: RRR  Pulm: CTAB  Abdomen:  soft, non-tender, non-distended, +bowel sounds.  Uterus:  Fundus firm below umbilicus  VE:  +lochia  Ext:  Non-tender.                          10.9   x     )-----------( x        ( 2023 08:46 )             33.1     -    138  |  109<H>  |  9   ----------------------------<  85  3.8   |  21<L>  |  0.50    Ca    9.6      2023 22:08    TPro  6.8  /  Alb  2.6<L>  /  TBili  0.2  /  DBili  x   /  AST  13<L>  /  ALT  14  /  AlkPhos  146<H>  04-      A/P: SAGAR FOWLER is a 22yo  now PPD# 1 s/p spontaneous vaginal delivery at 40weeks, gHTN  -Postpartum H&H stable  -Voiding, tolerating PO, bowel function nml   -Advance care as tolerated   -Continue routine postpartum care and education.  -Healthy male infant, declines circumcision.   SAGAR FOWLER is a 24yo  now PPD# 1 s/p spontaneous vaginal delivery at 40weeks, gHTN    S:    The patient has no complaints.  Pain controlled with current medications.   She is ambulating without difficulty and tolerating PO   + flatus/-BM/+ voiding   She endorses approciate lochia, which is decreasing  Breastfeeding    She denies fevers, chills, nausea and vomiting.   She denies HA, vision changes, lightheadedness, dizziness, palpitations, chest pain and SOB.    O:    T(C): 36.5 (23 @ 07:25), Max: 37.1 (23 @ 17:00)  HR: 89 (23 @ 07:25) (73 - 99)  BP: 112/55 (23 @ 07:25) (112/55 - 135/74)  RR: 16 (23 @ 07:25) (16 - 18)  SpO2: 98% (23 @ 07:25) (98% - 99%)    Gen: NAD, AOx3  CV: RRR  Pulm: CTAB  Abdomen:  soft, non-tender, non-distended, +bowel sounds.  Uterus:  Fundus firm below umbilicus  VE:  +lochia  Ext:  Non-tender.                          10.9   x     )-----------( x        ( 2023 08:46 )             33.1     -    138  |  109<H>  |  9   ----------------------------<  85  3.8   |  21<L>  |  0.50    Ca    9.6      2023 22:08    TPro  6.8  /  Alb  2.6<L>  /  TBili  0.2  /  DBili  x   /  AST  13<L>  /  ALT  14  /  AlkPhos  146<H>        A/P: ASGAR FOWLER is a 24yo  now PPD# 1 s/p spontaneous vaginal delivery at 40weeks, gHTN  -Postpartum H&H stable  -Monitor BP closely, no PEC symptoms at this time  -Voiding, tolerating PO, bowel function nml   -Advance care as tolerated   -Continue routine postpartum care and education.  -Healthy male infant, declines circumcision.

## 2023-04-06 NOTE — DISCHARGE NOTE OB - PLAN OF CARE
1. Take Motrin 600mg every 6 hours and/or tylenol 650mg every 6 hours as needed for pain.   2. Call your OBGYN to schedule a follow up appointment in 4-6weeks.   3. Call your OBGYN if you experiences severe abdominal pain not improved by oral pain medications, heavy bright red vaginal bleeding saturating more than 1 pad per hour, or fever greater than 100.4F.  4. Place nothing into the vagina for 6 weeks ( no tampons, sex or douching )  5. Expect to feel hot flashes in the first 2 weeks after delivery, especially if your breasts are engorged.

## 2023-04-06 NOTE — DISCHARGE NOTE OB - CARE PROVIDER_API CALL
Yasir Paul  OBSTETRICS AND GYNECOLOGY  [address] [facility], [address] [place] [address]  Phone: [phone]  Fax: [phone]  Established Patient  Follow Up Time: 1 month

## 2023-04-06 NOTE — DISCHARGE NOTE OB - CARE PLAN
1 Principal Discharge DX:	 (spontaneous vaginal delivery)  Assessment and plan of treatment:	1. Take Motrin 600mg every 6 hours and/or tylenol 650mg every 6 hours as needed for pain.   2. Call your OBGYN to schedule a follow up appointment in 4-6weeks.   3. Call your OBGYN if you experiences severe abdominal pain not improved by oral pain medications, heavy bright red vaginal bleeding saturating more than 1 pad per hour, or fever greater than 100.4F.  4. Place nothing into the vagina for 6 weeks ( no tampons, sex or douching )  5. Expect to feel hot flashes in the first 2 weeks after delivery, especially if your breasts are engorged.

## 2023-04-06 NOTE — DISCHARGE NOTE OB - MATERIALS PROVIDED
Vaccinations/[facility]  Screening Program/  Immunization Record/Breastfeeding Log/Breastfeeding Mother’s Support Group Information/Guide to Postpartum Care/[facility] Hearing Screen Program/Back To Sleep Handout/Shaken Baby Prevention Handout/Breastfeeding Guide and Packet

## 2023-04-07 VITALS — HEART RATE: 95 BPM | DIASTOLIC BLOOD PRESSURE: 72 MMHG | SYSTOLIC BLOOD PRESSURE: 127 MMHG

## 2023-04-07 RX ADMIN — Medication 600 MILLIGRAM(S): at 06:39

## 2023-04-07 RX ADMIN — Medication 975 MILLIGRAM(S): at 03:17

## 2023-04-07 RX ADMIN — Medication 975 MILLIGRAM(S): at 09:07

## 2023-04-07 RX ADMIN — Medication 1 TABLET(S): at 11:39

## 2023-04-07 RX ADMIN — Medication 975 MILLIGRAM(S): at 10:00

## 2023-04-07 RX ADMIN — Medication 600 MILLIGRAM(S): at 00:30

## 2023-04-07 NOTE — PROGRESS NOTE ADULT - SUBJECTIVE AND OBJECTIVE BOX
Postpartum Note,   Patient is a 23y woman  s/p  PPD#2 of a viable son     Subjective: Patient seen and examined at bedside.  ID: [id number]  No acute events overnight. Pain well controlled with current pain regimen. She is ambulating well and tolerating PO diet/fluids. She reports normal postpartum bleeding, having used 1 pad over the last 24 hrs. She is voiding well and reports flatus / BM. Reports breastfeeding without difficulties. Denies fever, headache, changes in vision, chest pain, SOB, nausea, vomiting. Otherwise, patient feels well without additional complaints.       Vital Signs Last 24 Hrs  T(C): 36.6 (2023 04:00), Max: 36.7 (2023 15:39)  T(F): 97.9 (2023 04:00), Max: 98 (2023 15:39)  HR: 88 (2023 04:00) (84 - 90)  BP: 120/65 (2023 04:00) (114/60 - 126/68)  BP(mean): --  RR: 16 (2023 04:00) (16 - 16)  SpO2: 99% (2023 04:00) (98% - 99%)    Parameters below as of 2023 04:00  Patient On (Oxygen Delivery Method): room air        Physical Exam:  Gen: NAD  Breast: Soft, nontender, not engorged  Cardio: S1,S2 no murmurs  Lungs: CTA B/L, no wheezing  Abdomen: Soft, nontender, no distension, firm uterine fundus at umbilicus  Ext: No calf tenderness bilaterally    LABS:                        10.9   x     )-----------( x        ( 2023 08:46 )             33.1         Allergies    No Known Allergies    Intolerances      MEDICATIONS  (STANDING):  acetaminophen     Tablet .. 975 milliGRAM(s) Oral <User Schedule>  diphtheria/tetanus/pertussis (acellular) Vaccine (Adacel) 0.5 milliLiter(s) IntraMuscular once  ibuprofen  Tablet. 600 milliGRAM(s) Oral every 6 hours  oxytocin Infusion 41.667 milliUNIT(s)/Min (125 mL/Hr) IV Continuous <Continuous>  oxytocin Infusion. 2 milliUNIT(s)/Min (2 mL/Hr) IV Continuous <Continuous>  prenatal multivitamin 1 Tablet(s) Oral daily    MEDICATIONS  (PRN):  benzocaine 20%/menthol 0.5% Spray 1 Spray(s) Topical every 6 hours PRN for Perineal discomfort  dibucaine 1% Ointment 1 Application(s) Topical every 6 hours PRN Perineal discomfort  diphenhydrAMINE 25 milliGRAM(s) Oral every 6 hours PRN Pruritus  hydrocortisone 1% Cream 1 Application(s) Topical every 6 hours PRN Moderate Pain (4-6)  lanolin Ointment 1 Application(s) Topical every 6 hours PRN nipple soreness  magnesium hydroxide Suspension 30 milliLiter(s) Oral two times a day PRN Constipation  oxyCODONE    IR 5 milliGRAM(s) Oral every 3 hours PRN Moderate to Severe Pain (4-10)  oxyCODONE    IR 5 milliGRAM(s) Oral once PRN Moderate to Severe Pain (4-10)  pramoxine 1%/zinc 5% Cream 1 Application(s) Topical every 4 hours PRN Moderate Pain (4-6)  simethicone 80 milliGRAM(s) Chew every 4 hours PRN Gas  witch hazel Pads 1 Application(s) Topical every 4 hours PRN Perineal discomfort        Assessment and Plan  Patient is a 23y woman  s/p  PPD#2 of a viable son     - s/p  PPD#2.  - Routine post-partum care.  - Pain well controlled, continue current pain regimen.  - Encouraged ambulation.  - Encouraged PO diet/fluids.  - Encouraged breastfeeding.  - Plan for DC today  Postpartum Note,   Patient is a 23y woman  s/p  PPD#2 of a viable son     Subjective: Patient seen and examined at bedside.  ID: [id number]  No acute events overnight. Pain well controlled with current pain regimen. She is ambulating well and tolerating PO diet/fluids. She reports normal postpartum bleeding, having used 1 pad over the last 24 hrs. She is voiding well and reports flatus / BM. Reports breastfeeding without difficulties. Denies fever, headache, changes in vision, chest pain, SOB, nausea, vomiting. Otherwise, patient feels well without additional complaints.       Vital Signs Last 24 Hrs  T(C): 36.6 (2023 04:00), Max: 36.7 (2023 15:39)  T(F): 97.9 (2023 04:00), Max: 98 (2023 15:39)  HR: 88 (2023 04:00) (84 - 90)  BP: 120/65 (2023 04:00) (114/60 - 126/68)  BP(mean): --  RR: 16 (2023 04:00) (16 - 16)  SpO2: 99% (2023 04:00) (98% - 99%)    Parameters below as of 2023 04:00  Patient On (Oxygen Delivery Method): room air        Physical Exam:  Gen: NAD  Breast: Soft, nontender, not engorged  Cardio: S1,S2 no murmurs  Lungs: CTA B/L, no wheezing  Abdomen: Soft, nontender, no distension, firm uterine fundus at umbilicus  Pelvis: minimal bleeding   Ext: No calf tenderness bilaterally    LABS:                        10.9   x     )-----------( x        ( 2023 08:46 )             33.1         Allergies    No Known Allergies    Intolerances      MEDICATIONS  (STANDING):  acetaminophen     Tablet .. 975 milliGRAM(s) Oral <User Schedule>  diphtheria/tetanus/pertussis (acellular) Vaccine (Adacel) 0.5 milliLiter(s) IntraMuscular once  ibuprofen  Tablet. 600 milliGRAM(s) Oral every 6 hours  oxytocin Infusion 41.667 milliUNIT(s)/Min (125 mL/Hr) IV Continuous <Continuous>  oxytocin Infusion. 2 milliUNIT(s)/Min (2 mL/Hr) IV Continuous <Continuous>  prenatal multivitamin 1 Tablet(s) Oral daily    MEDICATIONS  (PRN):  benzocaine 20%/menthol 0.5% Spray 1 Spray(s) Topical every 6 hours PRN for Perineal discomfort  dibucaine 1% Ointment 1 Application(s) Topical every 6 hours PRN Perineal discomfort  diphenhydrAMINE 25 milliGRAM(s) Oral every 6 hours PRN Pruritus  hydrocortisone 1% Cream 1 Application(s) Topical every 6 hours PRN Moderate Pain (4-6)  lanolin Ointment 1 Application(s) Topical every 6 hours PRN nipple soreness  magnesium hydroxide Suspension 30 milliLiter(s) Oral two times a day PRN Constipation  oxyCODONE    IR 5 milliGRAM(s) Oral every 3 hours PRN Moderate to Severe Pain (4-10)  oxyCODONE    IR 5 milliGRAM(s) Oral once PRN Moderate to Severe Pain (4-10)  pramoxine 1%/zinc 5% Cream 1 Application(s) Topical every 4 hours PRN Moderate Pain (4-6)  simethicone 80 milliGRAM(s) Chew every 4 hours PRN Gas  witch hazel Pads 1 Application(s) Topical every 4 hours PRN Perineal discomfort        Assessment and Plan  Patient is a 23y woman  s/p  PPD#2 of a viable son     - s/p  PPD#2.  - Routine post-partum care.  - Pain well controlled, continue current pain regimen.  - Encouraged ambulation.  - Encouraged PO diet/fluids.  - Encouraged breastfeeding.  - Plan for DC today

## 2023-04-09 ENCOUNTER — EMERGENCY (EMERGENCY)
Facility: HOSPITAL | Age: 23
LOS: 0 days | Discharge: LEFT BEFORE DISCHARGE | End: 2023-04-09
Payer: MEDICAID

## 2023-04-09 DIAGNOSIS — Z53.21 PROCEDURE AND TREATMENT NOT CARRIED OUT DUE TO PATIENT LEAVING PRIOR TO BEING SEEN BY HEALTH CARE PROVIDER: ICD-10-CM

## 2023-04-09 PROCEDURE — L9992: CPT

## 2023-04-12 DIAGNOSIS — O48.0 POST-TERM PREGNANCY: ICD-10-CM

## 2023-04-12 DIAGNOSIS — Z3A.40 40 WEEKS GESTATION OF PREGNANCY: ICD-10-CM

## 2024-12-17 NOTE — DISCHARGE NOTE OB - BREASTFEEDING PROVIDES STABLE TEMPERATURE THROUGH SKIN TO SKIN CONTACT
[facility]  POSTOP examination  Patient ID: Destiny is a 46 year old female.    Destiny accepted a chaperone.    Chief Complaint   Patient presents with    Surgical Followup         She is here for postop examination.   She is having no particular problems at this time.  She stopped using her pain medication.    Past Medical History:   Diagnosis Date    Asthma (CMD)     Chronic pain     Chronic pansinusitis     Diabetes mellitus  (CMD)     Essential (primary) hypertension     Motion sickness     Sleep apnea        Current Outpatient Medications   Medication Sig Dispense Refill    ketorolac (TORADOL) 10 MG tablet 1 tablet every 6 hours.  Do not combine with ibuprofen or any other NSAID. 20 tablet 0    metFORMIN (GLUCOPHAGE) 500 MG tablet Take 500 mg by mouth in the morning and 500 mg at noon and 500 mg in the evening.      tobramycin (TOBREX) 0.3 % ophthalmic solution Place 1 drop into both eyes in the morning and 1 drop in the evening.      fluticasone propionate (FLOVENT HFA) 220 MCG/ACT inhaler Inhale 1 puff into the lungs in the morning and 1 puff in the evening. 12 g 3    tirzepatide (MOUNJARO) 10 MG/0.5ML Solution Auto-injector Inject 10 mg into the skin every 7 days. Indications: Type 2 Diabetes 2 mL 1    acetaminophen-codeine (TYLENOL NO.3) 300-30 MG per tablet Take 1 tablet by mouth 2 times daily as needed.      amLODIPine (NORVASC) 5 MG tablet Take 5 mg by mouth daily.      losartan (COZAAR) 100 MG tablet Take 1 tablet by mouth daily. 90 tablet 3    amitriptyline (ELAVIL) 25 MG tablet Take 1 tablet by mouth at bedtime. 90 tablet 1    loratadine (CLARITIN) 10 MG tablet Take 1 tablet by mouth daily. 90 tablet 3    montelukast (SINGULAIR) 10 MG tablet Take 1 tablet by mouth nightly. 30 tablet 1    promethazine (PHENERGAN) 6.25 MG/5ML syrup Take 5 mLs by mouth 4 times daily as needed for Nausea. 118 mL 1    Continuous Glucose Sensor (FreeStyle Meghan 2 Sensor) Misc Change sensor every 14 days. 2 each 11    naLOXone  (NARCAN) 4 MG/0.1ML nasal liquid Spray the content of 1 device into 1 nostril. Call 911. May repeat with 2nd device in alternate nostril if no response in 2-3 minutes. 2 each 1    HYDROcodone-acetaminophen (NORCO) 7.5-325 MG per tablet Take 1 tablet by mouth every 6 hours as needed for Pain. 30 tablet 0    albuterol 108 (90 Base) MCG/ACT inhaler Inhale 2 puffs into the lungs every 4 hours as needed for Shortness of Breath or Wheezing. 8.5 g 11    benzonatate (TESSALON PERLES) 100 MG capsule Take 1 capsule by mouth 3 times daily as needed for Cough. 60 capsule 1    ibuprofen (MOTRIN) 800 MG tablet Take 1 tablet by mouth every 8 hours as needed for Pain. 90 tablet 3    Vitamin D, Ergocalciferol, 1.25 mg (50,000 units) capsule Take 1 capsule by mouth 1 day a week. 4 capsule 1    cyclobenzaprine (FLEXERIL) 10 MG tablet Take 1 tablet by mouth nightly as needed for Muscle spasms. 10 tablet 0    omeprazole (PrilOSEC) 20 MG capsule Take 1 capsule by mouth daily. 90 capsule 3    Alcohol Swabs (Alcohol Prep) 70 % Pads USE THREE TIMES DAILY 300 each 1    blood glucose test strip OneTouch Ultra Test strips   USE ONE STRIP TWICE DAILY AS DIRECTED      azelastine (ASTELIN) 0.1 % nasal spray azelastine 137 mcg (0.1 %) nasal spray aerosol   USE 2 SPRAYS IN EACH NOSTRIL TWICE DAILY IN THE EVENING      ipratropium (ATROVENT) 0.06 % nasal spray every 12 hours.      simvastatin (ZOCOR) 20 MG tablet Take 20 mg by mouth nightly.       No current facility-administered medications for this visit.       ALLERGIES:   Allergen Reactions    Cat Hair Extract ANAPHYLAXIS    Dog Dander ANAPHYLAXIS    Seasonal Runny Nose       Review of Systems   Respiratory: Negative.     Cardiovascular: Negative.    Gastrointestinal: Negative.         Some increased gas.   Genitourinary: Negative.  Negative for pelvic pain and vaginal bleeding.     Visit Vitals  BP (!) 154/78   Pulse 86   Ht 5' 6\" (1.676 m)   Wt 88.2 kg (194 lb 7.1 oz)   LMP 10/24/2024 (Exact  Date)   SpO2 99%   BMI 31.38 kg/m²     Physical Exam  Constitutional:       Appearance: Normal appearance.   Pulmonary:      Effort: Pulmonary effort is normal.   Abdominal:      General: Bowel sounds are normal. There is no distension.      Palpations: Abdomen is soft.      Tenderness: There is no abdominal tenderness (All incisions are healing well without any tenderness or erythema.).   Neurological:      Mental Status: She is alert.   Skin:     General: Skin is warm and dry.       Problem List Items Addressed This Visit    None  Visit Diagnoses       Postoperative examination    -  Primary    S/P robot-assisted surgical procedure                Assessment/ Plan:     Normal postop exam .  Doing well.  Follow-up in 4 weeks may increase her activities slowly to 4 weeks and then normal..            Clarke Moe MD, FACOG   Statement Selected

## 2025-02-03 NOTE — PATIENT PROFILE OB - TEACHING/LEARNING FACTORS IMPACT ABILITY TO LEARN
MEDICATIONS  (PRN):  acetaminophen     Tablet .. 650 milliGRAM(s) Oral every 6 hours PRN Temp greater or equal to 38C (100.4F), Mild Pain (1 - 3)  acetaminophen     Tablet .. 650 milliGRAM(s) Oral every 6 hours PRN Temp greater or equal to 38C (100.4F), Mild Pain (1 - 3), Moderate Pain (4 - 6)  diphenhydrAMINE 50 milliGRAM(s) Oral every 6 hours PRN Extrapyramidal symptoms or prophylaxis  diphenhydrAMINE Injectable 50 milliGRAM(s) IntraMuscular once PRN Extrapyramidal prophylaxis  haloperidol    Injectable 5 milliGRAM(s) IntraMuscular once PRN aggression  LORazepam   Injectable 2 milliGRAM(s) IntraMuscular once PRN agitation   language

## 2025-07-14 ENCOUNTER — RESULT REVIEW (OUTPATIENT)
Age: 25
End: 2025-07-14